# Patient Record
(demographics unavailable — no encounter records)

---

## 2024-12-02 NOTE — ASSESSMENT
[FreeTextEntry1] : 62 y.o. Male with PMHx of T2D, CAD, s/p multiple PCI, s/p CABG, HTN follows for DM management. Seen previously by Dr. Gudino but did not follow up. Then I saw him when he was hospitalized at [facility] for MI and CABG (7/2024). Patient was seen by NP as HFU. Dx with DM at age of 50 y. Never being on insulin prior hospital admission. FHx - father with MI and CHF  Currently on: Lantus 20 -23U qhs Fiasp 4-5U at dinner MFN 1000 mg BID Jardiance 25 mg daily (started last visit  DEXCOM - reviewed Active time 100%, In Range 62%, High 26%, Very High 12%, Low and Very low 0% Interpretation: Prandial hyperglycemia during the day. At goal over the night.   # Controlled T2D A1C 6.0% Patient would like to come off insulin Besides he gained closed to 10Lb after surgery.  I discussed with him GLP-1ra including risks, benefits, side effects, and contraindications.  Will start Ozempic 0.25 mg weekly for 4 weeks and increase to 0.5 mg weekly Patient instructed to consider decreasing dose of Insulin as the requirements would most likely decrease after starting Ozempic. Eventually he might be able to come off insulin. Continue current Metformin and Jardiance.   # HLD Controlled Lipids Continue Statin Advised low fat/cholesterol diet  F/u with NP in 3 months F/u with me in 6 months.

## 2024-12-02 NOTE — PHYSICAL EXAM
[Alert] : alert [No Acute Distress] : no acute distress [Well Developed] : well developed [Normal Voice/Communication] : normal voice communication [PERRL] : pupils equal, round and reactive to light [Normal Hearing] : hearing was normal [No Neck Mass] : no neck mass was observed [Thyroid Not Enlarged] : the thyroid was not enlarged [No Respiratory Distress] : no respiratory distress [Clear to Auscultation] : lungs were clear to auscultation bilaterally [Normal Rate] : heart rate was normal [Regular Rhythm] : with a regular rhythm [No Edema] : no peripheral edema [Normal Bowel Sounds] : normal bowel sounds [Soft] : abdomen soft [Normal Supraclavicular Nodes] : no supraclavicular lymphadenopathy [Normal Anterior Cervical Nodes] : no anterior cervical lymphadenopathy [No Clubbing, Cyanosis] : no clubbing  or cyanosis of the fingernails [No Tremors] : no tremors [Oriented x3] : oriented to person, place, and time [Normal Affect] : the affect was normal [Normal Insight/Judgement] : insight and judgment were intact [Normal Mood] : the mood was normal [de-identified] : Overweight [de-identified] : Obese

## 2024-12-02 NOTE — HISTORY OF PRESENT ILLNESS
[FreeTextEntry1] : 62 y.o. Male with PMHx of T2D, CAD, s/p multiple PCI, s/p CABG, HTN follows for DM management. Seen previously by Dr. Gudino but did not follow up. Then I saw him when he was hospitalized at [facility] for MI and CABG (7/2024). Patient was seen by NP as HFU. Dx with DM at age of 50 y. Never being on insulin prior hospital admission. FHx - father with MI and CHF

## 2025-03-21 NOTE — HISTORY OF PRESENT ILLNESS
[FreeTextEntry1] : Interval HX: doing well, he increased Ozempic and lowered insulin   Quality: T2DM Severity: moderate  Duration: 12 years ago at 50 years old  Onset: Stent placement  Modifying Factors: oral meds and insulin   SMBG CGM downloaded and reviewed: DEXCOM Average glucose:117 % time CGM active: 95% Glucose variability (target <36%): 26.4 % VERY HIGH (>250): 0 % HIGH (181-250): 4 % TARGET (): 95 % LOW (54-69): 1 % VERY LOW (<54): <1 Interpretation: overall excellent control   Current FS: 113 fasting   HgbA1C: no recent   Current Regimen: Lantus 10 units QHS for the past week (He lowered)  Fiasp 4-5 units TID AC (not taking really)  MFN 1000 mg BID Jardiance 25 mg QD  Ozempic1 mg weekly (He increased himself)   Eye Exam: over due, no idea, no DR as per patient  Foot Exam: sugar elevated, some numbness and tingling in feet  Kidney Disease: Heart Disease: CAD, 8 stents, CABG x 2 on 7/2/2024  Weight: stable  Diet: B- protein shake, veggie omlette L- myesha's killer bread with pb and sugar free jelly  D- tries not to eat pasta Brown rice  veggies and chicken  Snacks- (Like chips does not eat often) fruit, nuts  Drinks - water, seltzer Exercise: works for at computer, needs to start walking  Smoking:  ex-smoker   HLD -On Rosuvastatin 40 mg QD

## 2025-03-21 NOTE — ASSESSMENT
[Diabetes Foot Care] : diabetes foot care [Long Term Vascular Complications] : long term vascular complications of diabetes [Carbohydrate Consistent Diet] : carbohydrate consistent diet [Importance of Diet and Exercise] : importance of diet and exercise to improve glycemic control, achieve weight loss and improve cardiovascular health [Exercise/Effect on Glucose] : exercise/effect on glucose [Retinopathy Screening] : Patient was referred to ophthalmology for retinopathy screening [FreeTextEntry1] : T2DM -Reviewed risk/complication of uncontrolled DM  -Increase exercise as tolerated 5 days a week x 30 mins/day -Increase dietary efforts, low carb/low sugar -Continue use of CGM  -Repeat HgbA1C today  -Stop Fiasp -Continue Lantus 23 units QHS -Continue MFN 1000 mg BID -Continue Jardiance 25 mg QD -Increase Ozempic 1 mg weekly -Can decrease lantus every 3-4 days by 2 units  -Declines need for CDE   CGM STATEMENT: This patient with diabetes - Injects insulin 1+ times daily - Is currently on CGM, testing glucose continuously - Has been seen in the office by a provider within the last six months to review CGM data with their provider CGM is medically necessary for this pt. - CGM will improve/maintain A1c - CGM will reduce hypoglycemic events DEXCOM require one test strip daily to use in case of sensor failure or for blood glucose verification or during sensor warmup.  HLD -Continue statin and continue following up with cardiologist -Repeat lipids today    RTO in 3 months MD

## 2025-03-21 NOTE — PHYSICAL EXAM
[Alert] : alert [Normal Sclera/Conjunctiva] : normal sclera/conjunctiva [Normal Hearing] : hearing was normal [No Neck Mass] : no neck mass was observed [Thyroid Not Enlarged] : the thyroid was not enlarged [No Respiratory Distress] : no respiratory distress [No Accessory Muscle Use] : no accessory muscle use [Clear to Auscultation] : lungs were clear to auscultation bilaterally [Normal S1, S2] : normal S1 and S2 [No Stigmata of Cushings Syndrome] : no stigmata of Cushings Syndrome [Oriented x3] : oriented to person, place, and time

## 2025-03-31 NOTE — REVIEW OF SYSTEMS
[Joint Pain] : joint pain [Negative] : Psychiatric [Blood in stool] : no blood in stoo [Hematuria] : no hematuria [Dizziness] : dizziness [Tremor] : no tremor was seen [Convulsions] : no convulsions [Tingling (Paresthesia)] : no tingling [Limb Weakness (Paresis)] : no limb weakness (Paresis) [Easy Bleeding] : no tendency for easy bleeding [FreeTextEntry9] : hips/ knees

## 2025-03-31 NOTE — DISCUSSION/SUMMARY
[FreeTextEntry1] : Dizziness  lethargy obesity on Ozempic insulin dependent diabetes mellitus   - recommend check TSH, vitamin D, folate, b12 - recommend continued reduction of insulin dose with endocrinology support, he is wearing a dexcom device with glucose levels 's. -Wear a cardiac monitor for 3 days -Given his weight loss with Ozempic, I recommend that he dramatically increase his fluid intake with occasional electrolyte sugar-free intake.  Must drink 32 to 64 ounces of fluid daily which is significantly higher than the amount of plain seltzer water that he is drinking currently (1-3 cans) -Adjust Lopressor 25 mg twice daily to Toprol-XL 25mg qday   He should meet with his urologist to further discuss erectile dysfunction and potential medications available.  We will call patient in 2-3 days to evaluate if symptoms have improved with the following adjustments.  The patient is aware of alarm cardiac type symptoms, will call with questions or concerns and is aware to activate 911 and/or present to the closest emergency department if concerns.   [EKG obtained to assist in diagnosis and management of assessed problem(s)] : EKG obtained to assist in diagnosis and management of assessed problem(s)

## 2025-03-31 NOTE — PHYSICAL EXAM
[Normal Gait] : normal gait [Normal] : moves all extremities, no focal deficits, normal speech [Alert and Oriented] : alert and oriented [Normal memory] : normal memory [Normal Radial B/L] : normal radial B/L [de-identified] : sternal scar , well healed, residual left chest wall numbness

## 2025-03-31 NOTE — CARDIOLOGY SUMMARY
[de-identified] : 8/2024- Sinus with RBBB 10/17/2024:  Sinus 87 RBBB 03/2025: sinus rbbb [de-identified] : 8/27/2024 TTE LVEF 55-60% , normal diastolic function, normal RV, Lipomatous interatrial septal hypertrophy present. No pericardial effusion seen. Compared to the transthoracic echocardiogram performed on 6/30/2024, EF has improved from 40-45% --> 55-60%.

## 2025-03-31 NOTE — HISTORY OF PRESENT ILLNESS
[FreeTextEntry1] : Patient with history of CAD s/p PRECIOUS x 8 over 12 years, last one placed 2 months ago in LAD,  HTN, T2DM, Former 2 pk/day smoker but quit 20 years ago. Family hx of MI and CHF in father in late 40s. Had a stress test done after last stent placed 2 months ago which was equivocal, scheduled for a repeat stress test within a week. He then presented to [facility] ED on 6/29 with complaint of chest pain and dyspnea for several days prior to arrival. EKG in ED remarkable for STEMI with LHC revealing ISR of LAD stents and diffuse RCA disease. IABP was then placed and he was admitted to CTICU, then underwent CABGx2 (LIMA-LAD, SVG-PDA) on 7/2 by Dr. Renee. IABP was removed 7/3. He was started on ASA/plavix given his history of multiple stents and diffuse CAD. His post operative course was also remarkable for hyperglycemia requiring new insulin, and he will be discharged home on basal/premeal insulin with follow up with endocrinology team. Discharged to home on 7/7/2024  Today he reports ongoing fatigue and incisional pain. Denies fevers, chills, nausea or vomiting.  8/2024- Has had a difficult course physically. Feels shortness of breath.   10/2024- Feeling better overall. Active, but not exercising. SOB resolved. Still some incisional/ post -op soreness Denies chest pain, SOB/TIJERINA, PND, orthopnea, LE edema, palpitations, lightheadedness, syncope. He called rehab, told required evaluation and was not called back yet  03/2025: Presents for follow-up appointment due to dizziness ongoing x 1 month, feeling of lethargy.  Reports that he feels that the room is spinning.  No palpitations, no chest pain, chest pressure, vision issues. Reports starting Ozempic 2 to 3 months prior, only drinks 2 to 3 cans of seltzer a day, no further fluid intake.  Has been steadily decreasing his insulin dose due to improvement in weight and HbA1c value of 5.8%. He is going to be following up with his urologist due to ongoing erectile dysfunction, used to obtain trimix injections.  Reports his issues started prior to discovery of his coronary artery disease. Further cardiac concern at this

## 2025-04-02 NOTE — HISTORY OF PRESENT ILLNESS
[Pain Location] : pain [Stable] : stable [2] : a current pain level of 2/10 [Walking] : worsened by walking [Rest] : relieved by rest [de-identified] : This is a 62-year-old male presented with bilateral knee and bilateral hip pain he states bilateral knee pain has been present for the past 4 to 5 years when he climbs stairs Hip pain started about a year ago his left hip pain is slightly worse than the right he does have anterior groin pain the pain occurs when he sits and try to get up, but the pain is mild and intermittent.  He does not really exercise for his legs. He takes Tylenol for the pain once in a while. He does note lateral hip pain when laying on it at night.  But he is active with his house chores.  He is diabetic, had insulin in the past, on ozempic now. Is losing weight. On Plavix for prior heart attack.

## 2025-04-02 NOTE — PHYSICAL EXAM
[de-identified] : GENERAL APPEARANCE: Well nourished and hydrated, pleasant, alert, and oriented x 3. Appears their stated age.  HEENT: Normocephalic, extraocular eye motion intact. Nasal septum midline. Oral cavity clear. External auditory canal clear.  RESPIRATORY: Breath sounds clear and audible in all lobes. No wheezing, No accessory muscle use. CARDIOVASCULAR: No apparent abnormalities. No lower leg edema. No varicosities. Pedal pulses are palpable. NEUROLOGIC: Sensation is normal, no muscle weakness in the upper or lower extremities. DERMATOLOGIC: No apparent skin lesions, moist, warm, no rash. SPINE: Cervical spine appears normal and moves freely; thoracic spine appears normal and moves freely; lumbosacral spine appears normal and moves freely, normal, nontender. MUSCULOSKELETAL: Hands, wrists, and elbows are normal and move freely, shoulders are normal and move freely.  Musculoskeletal 5/5 motor strength in bilateral lower extremities. Sensory: Intact in bilateral lower extremities. DTRs: Biceps, brachioradialis, triceps, patellar, ankle and plantar 2+ and symmetric bilaterally. Pulses: dorsalis pedis, posterior tibial, femoral, popliteal, and radial 2+ and symmetric bilaterally.  Constitutional: Alert and in no acute distress, but well-appearing.   [de-identified] : Patient has good strength straight leg raise without provoking hip pain full range of motion of bilateral hip knee does not have any effusion no erythema 0 to 130 degree range of motion nonantalgic gait [de-identified] : 4V xray of the bilateral knee done in the office today and reviewed by Dr. Blayne Brennan demonstrates preserved joint spaces.  3V xray of the bilateral hip done in the office today and reviewed by Dr. Blayne Brennan demonstrates preserved joint spaces.

## 2025-04-02 NOTE — REVIEW OF SYSTEMS
[Joint Pain] : joint pain [Negative] : Heme/Lymph [FreeTextEntry9] : Bilateral hip and bilateral knee

## 2025-04-02 NOTE — END OF VISIT
[FreeTextEntry3] : Documented by Krysta Paz acting solely as a scribe for Dr. Blayne Brennan on this date 04/02/2025.   All Medical record entries made by the Scribe were at my, Dr. Blayne Brennan's, direction and personally dictated by me on 04/02/2025. I have reviewed the chart and agree that the record accurately reflects my personal performance of the history, physical exam, assessment and plan. I have also personally directed, reviewed, and agreed with the discharge instructions.

## 2025-04-02 NOTE — DISCUSSION/SUMMARY
[de-identified] : This is a 62 year old M pt presents today for evaluation of bilateral hips and knees pain. His x-ray for bilateral hip and knee showed preserved joint spaces. Patient is symptomatic for b/l hip trochanteric bursitis. The nature of condition and treatment options were discussed in detail. Patient is not a candidate for total knee or hip arthroplasty. At this time, I recommended to do low impact exercises to strengthen the lower extremities. We discussed about home exercises program with the pt. He may take Tylenol as needed. F/u PRN.

## 2025-04-21 NOTE — HISTORY OF PRESENT ILLNESS
[FreeTextEntry1] : Patient with history of CAD s/p PRECIOUS x 8 over 12 years, last one placed 2 months ago in LAD,  HTN, T2DM, Former 2 pk/day smoker but quit 20 years ago. Family hx of MI and CHF in father in late 40s. Had a stress test done after last stent placed 3/2024 which was equivocal, scheduled for a repeat stress test within a week. He then presented to [facility] ED on 6/29/24 with complaint of chest pain and dyspnea for several days prior to arrival. EKG in ED remarkable for STEMI with LHC revealing ISR of LAD stents and diffuse RCA disease. IABP was then placed and he was admitted to CTICU, then underwent CABGx2 (LIMA-LAD, SVG-PDA) on 7/2 by Dr. Renee. IABP was removed 7/3. He was started on ASA/plavix given his history of multiple stents and diffuse CAD. His post operative course was also remarkable for hyperglycemia requiring new insulin, and he will be discharged home on basal/premeal insulin with follow up with endocrinology team. Discharged to home on 7/7/2024  Today he reports ongoing fatigue and incisional pain. Denies fevers, chills, nausea or vomiting.  8/2024- Has had a difficult course physically. Feels shortness of breath.   4/2025- He was seen by Dr. Law in 3/2025 for dizziness ongoing for 1 month and lethargy. He reported starting Ozempic 2-3 months prior and only consuming 2-3 cans of seltzer per day without further fluid intake. HbA1c 5.8%. Recent labs TSH WNL, CMP WNL, Lipid panel WNL except HDL 33.

## 2025-04-21 NOTE — CARDIOLOGY SUMMARY
[de-identified] : 3/2025- sinus 83bpm, RBBB 10/2024: SR 87bpm, RBBB 8/2024- Sinus with RBBB.  [de-identified] : Event monitor 3/31-4/3/25: NSR avg HR 83bpm (range 62-136bpm), BBB, 1 episode of AT and VT, PAC and PVC burden <0.1% [de-identified] : 8/27/24 TTE: LVEF 55-60%, LVIDd 5.5cm, LVOT VTI 12.80cm, RV normal size/function, trace MR, RAP 3mmHg 7/2/24 RASHEED: LVEF 55-60%, RV normal size/function 6/20/24 TTE: LVEF 40-45%, LVIDd 4.7cm, LVOT VTI 16.56cm, RV normal size/function, moderate MR, trace TR, PASP 32mmHg with RAP 15mmHg, TAPSE 1.7cm [de-identified] : 6/29/24 LHC: severe critical LAD instent restenosis in mid and distal LAD, RCA with only moderate disease visually, by IVUS severe distal disease with positive remodeling, plan for CABG

## 2025-04-21 NOTE — DISCUSSION/SUMMARY
[FreeTextEntry1] : 1. CAD- s/p CABG. LIMA to LAD and SVg to RCA. On aspirin and plavix. Upto 1 year for plavix.  2. Shortness of breath- TTe, BNP. ** 3. Is active. Discussed cardiac rehab. Order placed.  4. Hld- LDL is 50s. Continue statin.  5. Follow up in 6 weeks. **

## 2025-04-21 NOTE — HISTORY OF PRESENT ILLNESS
[FreeTextEntry1] : Patient with history of CAD s/p PRECIOUS x 8 over 12 years, last one placed 2 months ago in LAD,  HTN, T2DM, Former 2 pk/day smoker but quit 20 years ago. Family hx of MI and CHF in father in late 40s. Had a stress test done after last stent placed 3/2024 which was equivocal, scheduled for a repeat stress test within a week. He then presented to [facility] ED on 6/29/24 with complaint of chest pain and dyspnea for several days prior to arrival. EKG in ED remarkable for STEMI with LHC revealing ISR of LAD stents and diffuse RCA disease. IABP was then placed and he was admitted to CTICU, then underwent CABGx2 (LIMA-LAD, SVG-PDA) on 7/2 by Dr. Renee. IABP was removed 7/3. He was started on ASA/plavix given his history of multiple stents and diffuse CAD. His post operative course was also remarkable for hyperglycemia requiring new insulin, and he will be discharged home on basal/premeal insulin with follow up with endocrinology team. Discharged to home on 7/7/2024  Today he reports ongoing fatigue and incisional pain. Denies fevers, chills, nausea or vomiting.  8/2024- Has had a difficult course physically. Feels shortness of breath.   4/2025- He was seen by Dr. Law in 3/2025 for dizziness ongoing for 1 month and lethargy. He reported starting Ozempic 2-3 months prior and only consuming 2-3 cans of seltzer per day without further fluid intake. HbA1c 5.8%. Recent labs TSH WNL, CMP WNL, Lipid panel WNL except HDL 33.

## 2025-06-15 NOTE — HISTORY OF PRESENT ILLNESS
[FreeTextEntry1] : 62 y.o. Male with PMHx of T2D, CAD, s/p multiple PCI, s/p CABG, HTN follows for DM management. Seen previously by Dr. Gudino but did not follow up. Then I saw him when he was hospitalized at [facility] for MI and CABG (7/2024). Patient was seen by NP as HFU. Dx with DM at age of 50 y. Never being on insulin prior hospital admission. FHx - father with MI and CHF

## 2025-06-15 NOTE — ASSESSMENT
[FreeTextEntry1] : 62 y.o. Very nice gentlemen with PMHx of T2D, CAD, s/p multiple PCI, s/p CABG, HTN follows for DM management. Seen previously by Dr. Gudino but did not follow up. Then I saw him when he was hospitalized at [facility] for MI and CABG (7/2024), followed by NP as HFU. Dx with DM at age of 50 y. Never been on insulin prior hospital admission. FHx - father with MI and CHF  Patient here for follow up. Feels well. Denies acute issues. Stopped basal - bolus regimen, shortly after his last visit.   Currently on: MFN 1000 mg BID Jardiance 25 mg daily  Ozempic 1 mg weekly  DEXCOM - reviewed Active time 75%, In Range 93%, High 4%, Very High 0%, Low 1% and Very low 2%% Interpretation: Excellent glycemic control.   # Controlled T2D Last A1C 5.8<==6.0% No recent BW. Will do it today. ADDENDUM: Repeat A1C 5.8% Continue current regimen Patient has concern of long-term use of Ozempic and possible muscle mass loss. I have discussion with him including all side effects of GLP-1ra including muscle mass loss. As of now will continue current regimen  Will reevaluate him next visit. Will possibly consider dose reduction of Ozempic if the weight significantly decreases.    # HLD Continue Statin Advised low fat/cholesterol diet F/u Lipids  F/u with NP in 3 months F/u with me in 6 months.

## 2025-06-15 NOTE — ASSESSMENT
[FreeTextEntry1] : 62 y.o. Very nice gentlemen with PMHx of T2D, CAD, s/p multiple PCI, s/p CABG, HTN follows for DM management. Seen previously by Dr. Gudino but did not follow up. Then I saw him when he was hospitalized at [facility] for MI and CABG (7/2024), followed by NP as HFU. Dx with DM at age of 50 y. Never been on insulin prior hospital admission. FHx - father with MI and CHF  Patient here for follow up. Feels well. Denies acute issues. Stopped basal - bolus regimen, shortly after his last visit.   Currently on: MFN 1000 mg BID Jardiance 25 mg daily  Ozempic 1 mg weekly  DEXCOM - reviewed Active time 75%, In Range 93%, High 4%, Very High 0%, Low 1% and Very low 2%% Interpretation: Excellent glycemic control.   # Controlled T2D Last A1C 5.8<==6.0% No recent BW. Will do it today. ADDENDUM: Repeat A1C 5.8% Continue current regimen Patient has concern of long-term use of Ozempic and possible muscle mass loss. I have discussion with him including all side effects of GLP-1ra including muscle mass loss. As of now will continue current regimen  Will reevaluate him next visit. Will possibly consider dose reduction of Ozempic if the weight significantly decreases.    # HLD Continue Statin Advised low fat/cholesterol diet F/u Lipids  F/u with NP in 3 months F/u with me in 6 months.

## 2025-06-15 NOTE — PHYSICAL EXAM
[Alert] : alert [No Acute Distress] : no acute distress [Well Developed] : well developed [Normal Voice/Communication] : normal voice communication [PERRL] : pupils equal, round and reactive to light [Normal Hearing] : hearing was normal [No Neck Mass] : no neck mass was observed [Thyroid Not Enlarged] : the thyroid was not enlarged [No Respiratory Distress] : no respiratory distress [Clear to Auscultation] : lungs were clear to auscultation bilaterally [Normal Rate] : heart rate was normal [Regular Rhythm] : with a regular rhythm [No Edema] : no peripheral edema [Normal Bowel Sounds] : normal bowel sounds [Soft] : abdomen soft [Normal Supraclavicular Nodes] : no supraclavicular lymphadenopathy [Normal Anterior Cervical Nodes] : no anterior cervical lymphadenopathy [No Clubbing, Cyanosis] : no clubbing  or cyanosis of the fingernails [No Tremors] : no tremors [Oriented x3] : oriented to person, place, and time [Normal Affect] : the affect was normal [Normal Insight/Judgement] : insight and judgment were intact [Normal Mood] : the mood was normal [de-identified] : Overweight [de-identified] : Obese

## 2025-06-15 NOTE — HISTORY OF PRESENT ILLNESS
[FreeTextEntry1] : 62 y.o. Male with PMHx of T2D, CAD, s/p multiple PCI, s/p CABG, HTN follows for DM management. Seen previously by Dr. Gudino but did not follow up. Then I saw him when he was hospitalized at [facility] for MI and CABG (7/2024). Patient was seen by NP as HFU. Dx with DM at age of 50 y. Never being on insulin prior hospital admission. FHx - father with MI and CHF

## 2025-06-15 NOTE — PHYSICAL EXAM
[Alert] : alert [No Acute Distress] : no acute distress [Well Developed] : well developed [Normal Voice/Communication] : normal voice communication [PERRL] : pupils equal, round and reactive to light [Normal Hearing] : hearing was normal [No Neck Mass] : no neck mass was observed [Thyroid Not Enlarged] : the thyroid was not enlarged [No Respiratory Distress] : no respiratory distress [Clear to Auscultation] : lungs were clear to auscultation bilaterally [Normal Rate] : heart rate was normal [Regular Rhythm] : with a regular rhythm [No Edema] : no peripheral edema [Normal Bowel Sounds] : normal bowel sounds [Soft] : abdomen soft [Normal Supraclavicular Nodes] : no supraclavicular lymphadenopathy [Normal Anterior Cervical Nodes] : no anterior cervical lymphadenopathy [No Clubbing, Cyanosis] : no clubbing  or cyanosis of the fingernails [No Tremors] : no tremors [Oriented x3] : oriented to person, place, and time [Normal Affect] : the affect was normal [Normal Insight/Judgement] : insight and judgment were intact [Normal Mood] : the mood was normal [de-identified] : Overweight [de-identified] : Obese

## 2025-07-10 NOTE — DISCUSSION/SUMMARY
[FreeTextEntry1] : CABGx2 (LIMA-LAD, SVG-PDA)  by Dr. Renee 2024 Dizziness - resolved lethargy - resolved diabetes on Ozempic insulin dependent diabetes mellitus   -Toprol-XL 50mg qday, we discussed his cardiac monitor   He should meet with his urologist to further discuss erectile dysfunction and potential medications available.   The patient is aware of alarm cardiac type symptoms, will call with questions or concerns and is aware to activate 911 and/or present to the closest emergency department if concerns.  Discussed to strongly pursue preventative cardiology, lifestyle modifications which are necessary for long-term cardiovascular health.  Provided structured, face-to-face preventive counseling focused on Advised a plant based, limited salt, low fat, minimal dairy diet, stress reduction/psychosomatic management, sleep hygiene/GUY testing and healthy weight loss, annual influenza vaccine, medication compliance, high risk behavior mitigation (I.e. tobacco abstinence, alcohol abstinence, recreational/illicit drug use abstinence), increased exercise activity as per AHA/ACC standard for long term cardiovascular risk reduction. lasting approximately 15 minutes. Discussed health risks, provided evidence-based guidance, and engaged the patient in shared decision-making to promote behavior change. Patient was receptive and actively participated in the discussion.  Follow up: with me in 1 year

## 2025-07-10 NOTE — REVIEW OF SYSTEMS
[Joint Pain] : joint pain [Dizziness] : dizziness [Negative] : Psychiatric [Blood in stool] : no blood in stoo [Hematuria] : no hematuria [Tremor] : no tremor was seen [Convulsions] : no convulsions [Tingling (Paresthesia)] : no tingling [Limb Weakness (Paresis)] : no limb weakness (Paresis) [Easy Bleeding] : no tendency for easy bleeding [FreeTextEntry9] : hips/ knees

## 2025-07-10 NOTE — HISTORY OF PRESENT ILLNESS
[FreeTextEntry1] : Patient with history of CAD s/p PRECIOUS x 8 over 12 years, last one placed 2 months ago in LAD,  HTN, T2DM, Former 2 pk/day smoker but quit 20 years ago. Family hx of MI and CHF in father in late 40s. Had a stress test done after last stent placed 2 months ago which was equivocal, scheduled for a repeat stress test within a week. He then presented to [facility] ED on 6/29 with complaint of chest pain and dyspnea for several days prior to arrival. EKG in ED remarkable for STEMI with LHC revealing ISR of LAD stents and diffuse RCA disease. IABP was then placed and he was admitted to [facility]ICU, then underwent CABGx2 (LIMA-LAD, SVG-PDA) on 7/2 by Dr. Renee. IABP was removed 7/3. He was started on ASA/plavix given his history of multiple stents and diffuse CAD. His post operative course was also remarkable for hyperglycemia requiring new insulin, and he will be discharged home on basal/premeal insulin with follow up with endocrinology team. Discharged to home on 7/7/2024  Today he reports ongoing fatigue and incisional pain. Denies fevers, chills, nausea or vomiting.  8/2024- Has had a difficult course physically. Feels shortness of breath.   10/2024- Feeling better overall. Active, but not exercising. SOB resolved. Still some incisional/ post -op soreness Denies chest pain, SOB/TIJERINA, PND, orthopnea, LE edema, palpitations, lightheadedness, syncope. He called rehab, told required evaluation and was not called back yet  03/2025: Presents for follow-up appointment due to dizziness ongoing x 1 month, feeling of lethargy.  Reports that he feels that the room is spinning.  No palpitations, no chest pain, chest pressure, vision issues. Reports starting Ozempic 2 to 3 months prior, only drinks 2 to 3 cans of seltzer a day, no further fluid intake.  Has been steadily decreasing his insulin dose due to improvement in weight and HbA1c value of 5.8%. He is going to be following up with his urologist due to ongoing erectile dysfunction, used to obtain trimix injections.  Reports his issues started prior to discovery of his coronary artery disease. Further cardiac concern at this   07/2025:  returns for follow up no symptoms, feels well, no concerns.

## 2025-07-10 NOTE — PHYSICAL EXAM
[Normal Gait] : normal gait [Normal Radial B/L] : normal radial B/L [Normal] : moves all extremities, no focal deficits, normal speech [Alert and Oriented] : alert and oriented [Normal memory] : normal memory [de-identified] : sternal scar , well healed, residual left chest wall numbness

## 2025-07-10 NOTE — CARDIOLOGY SUMMARY
[de-identified] : 8/2024- Sinus with RBBB 10/17/2024:  Sinus 87 RBBB 03/2025: sinus rbbb [de-identified] : 8/27/2024 TTE LVEF 55-60% , normal diastolic function, normal RV, Lipomatous interatrial septal hypertrophy present. No pericardial effusion seen. Compared to the transthoracic echocardiogram performed on 6/30/2024, EF has improved from 40-45% --> 55-60%.